# Patient Record
Sex: FEMALE | Race: WHITE | NOT HISPANIC OR LATINO
[De-identification: names, ages, dates, MRNs, and addresses within clinical notes are randomized per-mention and may not be internally consistent; named-entity substitution may affect disease eponyms.]

---

## 2022-11-15 PROBLEM — Z00.00 ENCOUNTER FOR PREVENTIVE HEALTH EXAMINATION: Status: ACTIVE | Noted: 2022-11-15

## 2022-11-23 ENCOUNTER — APPOINTMENT (OUTPATIENT)
Dept: NEUROSURGERY | Facility: CLINIC | Age: 62
End: 2022-11-23

## 2022-11-23 ENCOUNTER — NON-APPOINTMENT (OUTPATIENT)
Age: 62
End: 2022-11-23

## 2022-11-23 VITALS
HEART RATE: 74 BPM | BODY MASS INDEX: 19.31 KG/M2 | RESPIRATION RATE: 16 BRPM | TEMPERATURE: 97.6 F | OXYGEN SATURATION: 98 % | HEIGHT: 61.42 IN | WEIGHT: 103.62 LBS | SYSTOLIC BLOOD PRESSURE: 114 MMHG | DIASTOLIC BLOOD PRESSURE: 71 MMHG

## 2022-11-23 PROCEDURE — 99204 OFFICE O/P NEW MOD 45 MIN: CPT

## 2022-11-23 RX ORDER — CHROMIUM 200 MCG
TABLET ORAL
Refills: 0 | Status: ACTIVE | COMMUNITY

## 2022-11-23 NOTE — ASSESSMENT
[FreeTextEntry1] : 61-year-old English-speaking female who presents after progressive left-sided hearing loss confirmed on audiometry over the past year and was found to have a left-sided subcentimeter intracanalicular enhancing lesion concerning for an acoustic neuroma.  She does report intermittent balance issues, but otherwise is neurologically intact.  We discussed the natural history of the disease.  I explained to her that there were 2 options available: the patient could continue with serial imaging.  I also offered gamma knife radiosurgery as a therapeutic option.  I have recommended that she seek consultation with the radiation oncology department at [facility] for the discussion of risks and benefits of gamma knife radiosurgery for this lesion.  I have also ordered her a follow-up MRI at 3 months.  She demonstrated an excellent understanding.\par \par Dr. D' Amico independently reviewed all available images with patient. \par \par PLAN: \par - [facility] referral to discuss GKRS vs surveillance imaging \par - MRI brain with IAC 3 months\par \par Patient verbalizes understanding of today’s discussion and next steps in treatment plan. \par \par  \par A total of 45 minutes was spent reviewing the labs, imaging and physical examination of the patient. We discussed the diagnosis, and the plan. The patient's questions were answered. The patient demonstrated an excellent understanding of the plan.

## 2022-11-23 NOTE — HISTORY OF PRESENT ILLNESS
[de-identified] : 62 y/o, Guyanese speaking female with PMHx of who presents today for evaluation of abnormal MRI brain during workup for left hearing loss.\par \par Pt endorses left hearing loss that progressively worsened over the past year. States hearing diminished and with swoosh sound, muffled sounding. She went to her doctor who performed audiology exam and endorses was told left sided hearing loss for which MRI brain with IAC was ordered which she brings today to review. \par She denies headaches. Notes intermittent dizziness. \par \par Denies upper/lower extremity weakness. States will occasionally have bilateral hand weakness when she is dehydrated. \par

## 2022-11-23 NOTE — REASON FOR VISIT
[New Patient Visit] : a new patient visit [[employer] ] : provided by [employer]   [Interpreters_IDNumber] : [id number] [TWNoteComboBox1] : Ken[place]

## 2022-11-23 NOTE — PHYSICAL EXAM
[General Appearance - Alert] : alert [General Appearance - In No Acute Distress] : in no acute distress [General Appearance - Well Nourished] : well nourished [General Appearance - Well-Appearing] : healthy appearing [Oriented To Time, Place, And Person] : oriented to person, place, and time [Impaired Insight] : insight and judgment were intact [Affect] : the affect was normal [Memory Recent] : recent memory was not impaired [Person] : oriented to person [Place] : oriented to place [Time] : oriented to time [Short Term Intact] : short term memory intact [Motor Tone] : muscle tone was normal in all four extremities [Motor Strength] : muscle strength was normal in all four extremities [Sensation Tactile Decrease] : light touch was intact [Sclera] : the sclera and conjunctiva were normal [PERRL With Normal Accommodation] : pupils were equal in size, round, reactive to light, with normal accommodation [Extraocular Movements] : extraocular movements were intact [Neck Appearance] : the appearance of the neck was normal [] : no respiratory distress [Respiration, Rhythm And Depth] : normal respiratory rhythm and effort [Abnormal Walk] : normal gait [Skin Color & Pigmentation] : normal skin color and pigmentation [FreeTextEntry5] : CN II - XII grossly intact with left sided diminished hearing on exam

## 2022-11-23 NOTE — REVIEW OF SYSTEMS
[As Noted in HPI] : as noted in HPI [Fever] : no fever [Chills] : no chills [Confused or Disoriented] : no confusion [Facial Weakness] : no facial weakness [Arm Weakness] : no arm weakness [Hand Weakness] : no hand weakness [Leg Weakness] : no leg weakness [Seizures] : no convulsions [Difficulty Walking] : no difficulty walking [Eyesight Problems] : no eyesight problems [Chest Pain] : no chest pain [Palpitations] : no palpitations [Shortness Of Breath] : no shortness of breath

## 2022-11-28 ENCOUNTER — APPOINTMENT (OUTPATIENT)
Dept: RADIATION ONCOLOGY | Facility: CLINIC | Age: 62
End: 2022-11-28

## 2022-11-28 DIAGNOSIS — E87.1 HYPO-OSMOLALITY AND HYPONATREMIA: ICD-10-CM

## 2022-11-28 DIAGNOSIS — R79.89 OTHER SPECIFIED ABNORMAL FINDINGS OF BLOOD CHEMISTRY: ICD-10-CM

## 2022-11-28 DIAGNOSIS — Z78.9 OTHER SPECIFIED HEALTH STATUS: ICD-10-CM

## 2022-11-28 PROCEDURE — 99024 POSTOP FOLLOW-UP VISIT: CPT

## 2022-11-28 RX ORDER — NORMAL SALT TABLETS 1 G/G
TABLET ORAL
Refills: 0 | Status: ACTIVE | COMMUNITY

## 2022-11-28 NOTE — PHYSICAL EXAM
[Normal] : well developed, well nourished, in no acute distress [Oriented To Time, Place, And Person] : oriented to person, place, and time

## 2022-11-28 NOTE — REASON FOR VISIT
[Consideration of Therapy for Benign Disease] : consideration of therapy for benign disease [Other: ___] : [unfilled] [[employer] ] : provided by [employer]   [Interpreters_IDNumber] : [id number] [TWNoteComboBox1] : Singaporean

## 2023-03-01 ENCOUNTER — APPOINTMENT (OUTPATIENT)
Dept: NEUROSURGERY | Facility: CLINIC | Age: 63
End: 2023-03-01
Payer: MEDICAID

## 2023-03-01 VITALS
WEIGHT: 103 LBS | RESPIRATION RATE: 18 BRPM | TEMPERATURE: 97 F | HEIGHT: 61 IN | SYSTOLIC BLOOD PRESSURE: 90 MMHG | HEART RATE: 81 BPM | OXYGEN SATURATION: 98 % | BODY MASS INDEX: 19.45 KG/M2 | DIASTOLIC BLOOD PRESSURE: 63 MMHG

## 2023-03-01 PROCEDURE — 99212 OFFICE O/P EST SF 10 MIN: CPT

## 2023-03-01 NOTE — ASSESSMENT
[FreeTextEntry1] : 61-year-old Albanian-speaking female who presents after progressive left-sided hearing loss confirmed on audiometry over the past year and was found to have a left-sided subcentimeter intracanalicular enhancing lesion concerning for an acoustic neuroma.  She does report intermittent balance issues, but otherwise is neurologically intact.  Symptoms stable. MRI stable at 3 months. We discussed the natural history of the disease.  I explained to her that there were 2 options available: the patient could continue with serial imaging.  I also offered gamma knife radiosurgery as a therapeutic option. She wishes to continue with surveilance imaging.  I have also ordered her a follow-up MRI at 6 months.  She demonstrated an excellent understanding.\par \par Dr. D' Amico independently reviewed all available images with patient. \par \par PLAN: \par - Repeat MRI brain with IAC 6 months\par - RTC after MRI to review \par \par Patient verbalizes understanding of today’s discussion and next steps in treatment plan. \par \par \par A total of 15 minutes was spent reviewing the labs, imaging and physical examination of the patient. We discussed the diagnosis, and the plan. The patient's questions were answered. The patient demonstrated an excellent understanding of the plan.

## 2023-03-01 NOTE — HISTORY OF PRESENT ILLNESS
[FreeTextEntry1] : 60 y/o, Citizen of [place] speaking female with no significant PMHx who presented for evaluation of left internal auditory canal enhancing lesion on MRI 11/18/22 found during workup for progressive left hearing loss x 1 year. \par \par 11/23/22 initial presentation: \par States hearing diminished and with swoosh sound, muffled sound. She went to her doctor who performed audiology exam which confirmed left sided sensorineural hearing loss. MRI brain with IAC was ordered which she completed 11/18/22. \par She denies headaches. Notes intermittent dizziness. \par Pt was referrer to discuss GKRS- she met with Dr. Travis and opted for 3 month interval scan. \par \par TODAY patient returns for follow- up and MRI review. \par Denies any changes since prior visit. Continued left sided diminished hearing, denies worsening over the past 3 months. \par Denies headaches, balance issues. Continues to have intermittent dizziness and nausea.

## 2023-03-01 NOTE — REASON FOR VISIT
[Follow-Up: _____] : a [unfilled] follow-up visit [[employer] ] : provided by [employer]   [FreeTextEntry1] : left internal auditory canal enhancing lesion, review 3 month repeat MRI [Interpreters_IDNumber] : [id number] [TWNoteComboBox1] : Citizen of [place]

## 2023-09-07 PROBLEM — H91.92 HEARING LOSS ON LEFT: Status: ACTIVE | Noted: 2022-11-23

## 2023-09-07 PROBLEM — D33.3 VESTIBULAR SCHWANNOMA: Status: ACTIVE | Noted: 2022-11-23

## 2023-09-07 NOTE — HISTORY OF PRESENT ILLNESS
[FreeTextEntry1] : 61 y/o, Bermudian speaking female with no significant PMHx, found to have left internal auditory canal enhancing lesion on MRI 11/18/22 found during workup for progressive left hearing loss x 1 year.  11/23/22 initial presentation: States hearing diminished and with swoosh sound, muffled sound. She went to her doctor who performed audiology exam which confirmed left sided sensorineural hearing loss. MRI brain with IAC was ordered which she completed 11/18/22.  Notes intermittent dizziness, no headaches. Referred to discuss gkrs- she met with Dr. Travis and opted for 3 month interval scan.  3/1/23 pt returned for f/u. MRI 2/16/23 stable @ 3 months. Discussed GKRS vs continues surveillance and pt wishes to continue to monitor.   Returns TODAY for 6 month f/u and imaging review.

## 2023-09-13 ENCOUNTER — APPOINTMENT (OUTPATIENT)
Dept: NEUROSURGERY | Facility: CLINIC | Age: 63
End: 2023-09-13
Payer: MEDICAID

## 2023-09-13 VITALS
OXYGEN SATURATION: 98 % | BODY MASS INDEX: 19.45 KG/M2 | HEART RATE: 73 BPM | WEIGHT: 103 LBS | SYSTOLIC BLOOD PRESSURE: 113 MMHG | RESPIRATION RATE: 18 BRPM | DIASTOLIC BLOOD PRESSURE: 74 MMHG | HEIGHT: 61 IN

## 2023-09-13 DIAGNOSIS — H91.92 UNSPECIFIED HEARING LOSS, LEFT EAR: ICD-10-CM

## 2023-09-13 DIAGNOSIS — D33.3 BENIGN NEOPLASM OF CRANIAL NERVES: ICD-10-CM

## 2023-09-13 PROCEDURE — 99212 OFFICE O/P EST SF 10 MIN: CPT

## 2023-10-30 ENCOUNTER — APPOINTMENT (OUTPATIENT)
Dept: OTOLARYNGOLOGY | Facility: CLINIC | Age: 63
End: 2023-10-30

## 2024-08-27 NOTE — HISTORY OF PRESENT ILLNESS
[FreeTextEntry1] : 62 y/o, South Sudanese speaking female with no significant PMHx, found to have left internal auditory canal enhancing lesion on MRI 11/18/22 found during workup for progressive left hearing loss x 1 year.  11/23/22 initial presentation: States hearing diminished and with swoosh sound, muffled sound. She went to her doctor who performed audiology exam which confirmed left sided sensorineural hearing loss. MRI brain with IAC was ordered which she completed 11/18/22. Notes intermittent dizziness, no headaches. Referred to discuss gkrs- she met with Dr. Travis and opted for 3 month interval scan.  3/1/23 pt returned for f/u. MRI 2/16/23 stable @ 3 months. Discussed GKRS vs continued surveillance and pt wishes to continue to monitor.  9/13/23 pt returned for 6 month f/u and imaging review. Completed MRI @ [facility] 9/11/23 and report read as stable. Denies any changes since last visit in her left hearing, slight right sided hearing loss that is new. Has not repeated audiology exam officially. Again discussed SKRS vs continued surveillance and pt wishes to continue to monitor. Recommended to repeat MRI brain and IAC one year. Also referred for audiology testing.   Returns TODAY for annual follow- up and MRI review that she completed @ [facility]. Brings CD with her for apt.

## 2024-08-27 NOTE — REASON FOR VISIT
[Follow-Up: _____] : a [unfilled] follow-up visit [FreeTextEntry1] :  left internal auditory canal enhancing lesion, 1 year follow- up and MRI review

## 2024-09-17 ENCOUNTER — OUTPATIENT (OUTPATIENT)
Dept: OUTPATIENT SERVICES | Facility: HOSPITAL | Age: 64
LOS: 1 days | End: 2024-09-17
Payer: MEDICAID

## 2024-09-17 ENCOUNTER — APPOINTMENT (OUTPATIENT)
Dept: MRI IMAGING | Facility: HOSPITAL | Age: 64
End: 2024-09-17

## 2024-09-17 PROCEDURE — A9585: CPT

## 2024-09-17 PROCEDURE — 70553 MRI BRAIN STEM W/O & W/DYE: CPT

## 2024-09-17 PROCEDURE — 70553 MRI BRAIN STEM W/O & W/DYE: CPT | Mod: 26,76

## 2024-09-25 ENCOUNTER — NON-APPOINTMENT (OUTPATIENT)
Age: 64
End: 2024-09-25

## 2024-09-25 ENCOUNTER — APPOINTMENT (OUTPATIENT)
Dept: NEUROSURGERY | Facility: CLINIC | Age: 64
End: 2024-09-25
Payer: MEDICAID

## 2024-09-25 VITALS
DIASTOLIC BLOOD PRESSURE: 68 MMHG | SYSTOLIC BLOOD PRESSURE: 108 MMHG | HEART RATE: 66 BPM | OXYGEN SATURATION: 98 % | BODY MASS INDEX: 19.45 KG/M2 | HEIGHT: 61 IN | RESPIRATION RATE: 18 BRPM | WEIGHT: 103 LBS

## 2024-09-25 DIAGNOSIS — D33.3 BENIGN NEOPLASM OF CRANIAL NERVES: ICD-10-CM

## 2024-09-25 DIAGNOSIS — H91.92 UNSPECIFIED HEARING LOSS, LEFT EAR: ICD-10-CM

## 2024-09-25 PROCEDURE — 99212 OFFICE O/P EST SF 10 MIN: CPT

## 2024-09-25 NOTE — REASON FOR VISIT
[[employer] ] : provided by [employer]   [FreeTextEntry1] :  left internal auditory canal enhancing lesion, 1 year follow- up and MRI review  [Interpreters_IDNumber] : [id number]

## 2024-09-25 NOTE — ASSESSMENT
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 No changes were observed in the annual follow-up. However, there is a report of worsening hearing on both sides.Continue to monitor the condition regularly, with additional audiometry due to increasing hearing loss symptoms. The patient has expressed preference to continue surveillance rather than pursue treatment options.We discussed the natural history of the disease.  I explained to her that there were 2 options available: the patient could continue with serial imaging.  I also offered gamma knife radiosurgery as a therapeutic option. She wishes to continue with surveilance imaging.  I have also ordered her a follow-up MRI at 1 year.  She demonstrated an excellent understanding.  Dr. D'Amico independently reviewed all available images with patient.  PLAN: -MRI brain with IAC in 1 year -return to clinic after to review  -continue f/u with audiologist annually, seeing next Oct 2024  Patient verbalizes understanding of today's discussion and next steps in treatment plan.  Today, my ACP, Yanci Amato, was here to observe my evaluation and management services for this new problem/exacerbated condition to be followed going forward.   A total of 15 minutes was spent reviewing the labs, imaging and physical examination of the patient. We discussed the diagnosis, and the plan. The patient's questions were answered. The patient demonstrated an excellent understanding of the plan.

## 2024-09-25 NOTE — ASSESSMENT
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 No changes were observed in the annual follow-up. However, there is a report of worsening hearing on both sides.Continue to monitor the condition regularly, with additional audiometry due to increasing hearing loss symptoms. The patient has expressed preference to continue surveillance rather than pursue treatment options.We discussed the natural history of the disease.  I explained to her that there were 2 options available: the patient could continue with serial imaging.  I also offered gamma knife radiosurgery as a therapeutic option. She wishes to continue with surveilance imaging.  I have also ordered her a follow-up MRI at 1 year.  She demonstrated an excellent understanding.  Dr. D'Amico independently reviewed all available images with patient.  PLAN: -MRI brain with IAC in 1 year -return to clinic after to review  -continue f/u with audiologist annually, seeing next Oct 2024  Patient verbalizes understanding of today's discussion and next steps in treatment plan.  Today, my ACP, Yanci Amato, was here to observe my evaluation and management services for this new problem/exacerbated condition to be followed going forward.   A total of 15 minutes was spent reviewing the labs, imaging and physical examination of the patient. We discussed the diagnosis, and the plan. The patient's questions were answered. The patient demonstrated an excellent understanding of the plan.

## 2024-09-25 NOTE — ASSESSMENT
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 No changes were observed in the annual follow-up. However, there is a report of worsening hearing on both sides.Continue to monitor the condition regularly, with additional audiometry due to increasing hearing loss symptoms. The patient has expressed preference to continue surveillance rather than pursue treatment options.We discussed the natural history of the disease.  I explained to her that there were 2 options available: the patient could continue with serial imaging.  I also offered gamma knife radiosurgery as a therapeutic option. She wishes to continue with surveilance imaging.  I have also ordered her a follow-up MRI at 1 year.  She demonstrated an excellent understanding.  Dr. D'Amico independently reviewed all available images with patient.  PLAN: -MRI brain with IAC in 1 year -return to clinic after to review  -continue f/u with audiologist annually, seeing next Oct 2024  Patient verbalizes understanding of today's discussion and next steps in treatment plan.  Today, my ACP, Yanci Amato, was here to observe my evaluation and management services for this new problem/exacerbated condition to be followed going forward.   A total of 15 minutes was spent reviewing the labs, imaging and physical examination of the patient. We discussed the diagnosis, and the plan. The patient's questions were answered. The patient demonstrated an excellent understanding of the plan.

## 2024-09-25 NOTE — REASON FOR VISIT
[[employer] ] : provided by [employer]   [FreeTextEntry1] :  left internal auditory canal enhancing lesion, 1 year follow- up and MRI review  [Interpreters_IDNumber] : [id number]

## 2024-09-25 NOTE — REASON FOR VISIT
[[employer] ] : provided by [employer]   [FreeTextEntry1] :  left internal auditory canal enhancing lesion, 1 year follow- up and MRI review  [Interpreters_IDNumber] : [id number]

## 2024-09-25 NOTE — HISTORY OF PRESENT ILLNESS
[FreeTextEntry1] : 62 y/o, Tunisian speaking female with no significant PMHx, found to have left internal auditory canal enhancing lesion on MRI 11/18/22 found during workup for progressive left hearing loss x 1 year.  11/23/22 initial presentation: States hearing diminished and with swoosh sound, muffled sound. She went to her doctor who performed audiology exam which confirmed left sided sensorineural hearing loss. MRI brain with IAC was ordered which she completed 11/18/22. Notes intermittent dizziness, no headaches. Referred to discuss gkrs- she met with Dr. Travis and opted for 3 month interval scan.  3/1/23 pt returned for f/u. MRI 2/16/23 stable @ 3 months. Discussed GKRS vs continued surveillance and pt wishes to continue to monitor.  9/13/23 pt returned for 6 month f/u and imaging review. Completed MRI @ [facility] 9/11/23 and report read as stable. Denies any changes since last visit in her left hearing, slight right sided hearing loss that is new. Has not repeated audiology exam officially. Again discussed SKRS vs continued surveillance and pt wishes to continue to monitor. Recommended to repeat MRI brain and IAC one year. Also referred for audiology testing.   Returns TODAY for annual follow-up and imaging review. Completed MRI @ [facility] on 9/17/24 and report read as stable.  Reports hearing on left is stable and right sided hearing loss is worsening since last visit. Seeing audiologist end of October and reports had an audiogram 8 months ago which per patient was relatively stable.

## 2024-09-25 NOTE — REASON FOR VISIT
[[employer] ] : provided by [employer]   [FreeTextEntry1] :  left internal auditory canal enhancing lesion, 1 year follow- up and MRI review  [Interpreters_IDNumber] : [id number]

## 2024-09-25 NOTE — HISTORY OF PRESENT ILLNESS
[FreeTextEntry1] : 62 y/o, Georgian speaking female with no significant PMHx, found to have left internal auditory canal enhancing lesion on MRI 11/18/22 found during workup for progressive left hearing loss x 1 year.  11/23/22 initial presentation: States hearing diminished and with swoosh sound, muffled sound. She went to her doctor who performed audiology exam which confirmed left sided sensorineural hearing loss. MRI brain with IAC was ordered which she completed 11/18/22. Notes intermittent dizziness, no headaches. Referred to discuss gkrs- she met with Dr. Travis and opted for 3 month interval scan.  3/1/23 pt returned for f/u. MRI 2/16/23 stable @ 3 months. Discussed GKRS vs continued surveillance and pt wishes to continue to monitor.  9/13/23 pt returned for 6 month f/u and imaging review. Completed MRI @ [facility] 9/11/23 and report read as stable. Denies any changes since last visit in her left hearing, slight right sided hearing loss that is new. Has not repeated audiology exam officially. Again discussed SKRS vs continued surveillance and pt wishes to continue to monitor. Recommended to repeat MRI brain and IAC one year. Also referred for audiology testing.   Returns TODAY for annual follow-up and imaging review. Completed MRI @ [facility] on 9/17/24 and report read as stable.  Reports hearing on left is stable and right sided hearing loss is worsening since last visit. Seeing audiologist end of October and reports had an audiogram 8 months ago which per patient was relatively stable.

## 2024-09-25 NOTE — PHYSICAL EXAM
[General Appearance - Alert] : alert [General Appearance - In No Acute Distress] : in no acute distress [Oriented To Time, Place, And Person] : oriented to person, place, and time [Sclera] : the sclera and conjunctiva were normal [Outer Ear] : the ears and nose were normal in appearance [Neck Appearance] : the appearance of the neck was normal [] : no respiratory distress [Abnormal Walk] : normal gait [Skin Color & Pigmentation] : normal skin color and pigmentation

## 2024-09-25 NOTE — DATA REVIEWED
[de-identified] : ACC: [id number]     EXAM:  MR BRAIN WAW IC   ORDERED BY: ONEL FLETCHER  ACC: [id number]     EXAM:  MR IAC ONLY WAW IC   ORDERED BY: ONEL FLETCHER  PROCEDURE DATE:  09/17/2024    INTERPRETATION:  CLINICAL INDICATION: Vestibular schwannoma.  TECHNIQUE: Multi-planar multi-sequential MR imaging of the brain with special attention to the internal auditory canals was performed before and after the intravenous administration of 7.5 ml of Gadavist.  COMPARISON: MRI from 2/16/2023.  FINDINGS:  Enhancing nodule in the lateral aspect of the left internal auditory canal with no significant fundal cap measuring about 3 x 5 mm. Compared to the prior study from February 2023, this appears similar in size. No enhancement is seen within the inner ear. Signal within the inner ears is  normal.  No acute infarction or intracranial hemorrhage. No abnormal intraparenchymal enhancement is identified. Hippocampal sulcus remnant cyst in either bilaterally.  The ventricles are normal without evidence of hydrocephalus. There are no extra-axial fluid collections. The skull base flow voids are present.  The visualized intraorbital contents are normal. The imaged portions of the paranasal sinuses demonstrate mild mucosal thickening.. The mastoid air cells are clear. The other visualized soft tissues and osseous structures appear normal.  IMPRESSION:   1. Stable size of left vestibular schwannoma compared to February 2023.

## 2024-09-25 NOTE — DATA REVIEWED
[de-identified] : ACC: [id number]     EXAM:  MR BRAIN WAW IC   ORDERED BY: ONEL FLETCHER  ACC: [id number]     EXAM:  MR IAC ONLY WAW IC   ORDERED BY: ONEL FLETCHER  PROCEDURE DATE:  09/17/2024    INTERPRETATION:  CLINICAL INDICATION: Vestibular schwannoma.  TECHNIQUE: Multi-planar multi-sequential MR imaging of the brain with special attention to the internal auditory canals was performed before and after the intravenous administration of 7.5 ml of Gadavist.  COMPARISON: MRI from 2/16/2023.  FINDINGS:  Enhancing nodule in the lateral aspect of the left internal auditory canal with no significant fundal cap measuring about 3 x 5 mm. Compared to the prior study from February 2023, this appears similar in size. No enhancement is seen within the inner ear. Signal within the inner ears is  normal.  No acute infarction or intracranial hemorrhage. No abnormal intraparenchymal enhancement is identified. Hippocampal sulcus remnant cyst in either bilaterally.  The ventricles are normal without evidence of hydrocephalus. There are no extra-axial fluid collections. The skull base flow voids are present.  The visualized intraorbital contents are normal. The imaged portions of the paranasal sinuses demonstrate mild mucosal thickening.. The mastoid air cells are clear. The other visualized soft tissues and osseous structures appear normal.  IMPRESSION:   1. Stable size of left vestibular schwannoma compared to February 2023.

## 2024-09-25 NOTE — HISTORY OF PRESENT ILLNESS
[FreeTextEntry1] : 62 y/o, Somali speaking female with no significant PMHx, found to have left internal auditory canal enhancing lesion on MRI 11/18/22 found during workup for progressive left hearing loss x 1 year.  11/23/22 initial presentation: States hearing diminished and with swoosh sound, muffled sound. She went to her doctor who performed audiology exam which confirmed left sided sensorineural hearing loss. MRI brain with IAC was ordered which she completed 11/18/22. Notes intermittent dizziness, no headaches. Referred to discuss gkrs- she met with Dr. Travis and opted for 3 month interval scan.  3/1/23 pt returned for f/u. MRI 2/16/23 stable @ 3 months. Discussed GKRS vs continued surveillance and pt wishes to continue to monitor.  9/13/23 pt returned for 6 month f/u and imaging review. Completed MRI @ [facility] 9/11/23 and report read as stable. Denies any changes since last visit in her left hearing, slight right sided hearing loss that is new. Has not repeated audiology exam officially. Again discussed SKRS vs continued surveillance and pt wishes to continue to monitor. Recommended to repeat MRI brain and IAC one year. Also referred for audiology testing.   Returns TODAY for annual follow-up and imaging review. Completed MRI @ [facility] on 9/17/24 and report read as stable.  Reports hearing on left is stable and right sided hearing loss is worsening since last visit. Seeing audiologist end of October and reports had an audiogram 8 months ago which per patient was relatively stable.

## 2024-09-25 NOTE — DATA REVIEWED
[de-identified] : ACC: [id number]     EXAM:  MR BRAIN WAW IC   ORDERED BY: ONEL FLETCHER  ACC: [id number]     EXAM:  MR IAC ONLY WAW IC   ORDERED BY: ONEL FLETCHER  PROCEDURE DATE:  09/17/2024    INTERPRETATION:  CLINICAL INDICATION: Vestibular schwannoma.  TECHNIQUE: Multi-planar multi-sequential MR imaging of the brain with special attention to the internal auditory canals was performed before and after the intravenous administration of 7.5 ml of Gadavist.  COMPARISON: MRI from 2/16/2023.  FINDINGS:  Enhancing nodule in the lateral aspect of the left internal auditory canal with no significant fundal cap measuring about 3 x 5 mm. Compared to the prior study from February 2023, this appears similar in size. No enhancement is seen within the inner ear. Signal within the inner ears is  normal.  No acute infarction or intracranial hemorrhage. No abnormal intraparenchymal enhancement is identified. Hippocampal sulcus remnant cyst in either bilaterally.  The ventricles are normal without evidence of hydrocephalus. There are no extra-axial fluid collections. The skull base flow voids are present.  The visualized intraorbital contents are normal. The imaged portions of the paranasal sinuses demonstrate mild mucosal thickening.. The mastoid air cells are clear. The other visualized soft tissues and osseous structures appear normal.  IMPRESSION:   1. Stable size of left vestibular schwannoma compared to February 2023.

## 2024-09-25 NOTE — ASSESSMENT
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 No changes were observed in the annual follow-up. However, there is a report of worsening hearing on both sides.Continue to monitor the condition regularly, with additional audiometry due to increasing hearing loss symptoms. The patient has expressed preference to continue surveillance rather than pursue treatment options.We discussed the natural history of the disease.  I explained to her that there were 2 options available: the patient could continue with serial imaging.  I also offered gamma knife radiosurgery as a therapeutic option. She wishes to continue with surveilance imaging.  I have also ordered her a follow-up MRI at 1 year.  She demonstrated an excellent understanding.  Dr. D'Amico independently reviewed all available images with patient.  PLAN: -MRI brain with IAC in 1 year -return to clinic after to review  -continue f/u with audiologist annually, seeing next Oct 2024  Patient verbalizes understanding of today's discussion and next steps in treatment plan.  Today, my ACP, Yanci Amato, was here to observe my evaluation and management services for this new problem/exacerbated condition to be followed going forward.   A total of 15 minutes was spent reviewing the labs, imaging and physical examination of the patient. We discussed the diagnosis, and the plan. The patient's questions were answered. The patient demonstrated an excellent understanding of the plan.

## 2024-10-09 ENCOUNTER — APPOINTMENT (OUTPATIENT)
Dept: OTOLARYNGOLOGY | Facility: CLINIC | Age: 64
End: 2024-10-09
Payer: MEDICAID

## 2024-10-09 PROCEDURE — 92557 COMPREHENSIVE HEARING TEST: CPT

## 2024-10-09 PROCEDURE — 92550 TYMPANOMETRY & REFLEX THRESH: CPT | Mod: 52

## 2024-10-23 ENCOUNTER — APPOINTMENT (OUTPATIENT)
Dept: NEUROSURGERY | Facility: CLINIC | Age: 64
End: 2024-10-23

## 2025-09-11 ENCOUNTER — APPOINTMENT (OUTPATIENT)
Dept: MRI IMAGING | Facility: HOSPITAL | Age: 65
End: 2025-09-11

## 2025-09-17 ENCOUNTER — APPOINTMENT (OUTPATIENT)
Dept: NEUROSURGERY | Facility: CLINIC | Age: 65
End: 2025-09-17
Payer: MEDICAID

## 2025-09-17 VITALS
RESPIRATION RATE: 18 BRPM | HEART RATE: 77 BPM | DIASTOLIC BLOOD PRESSURE: 67 MMHG | HEIGHT: 61 IN | BODY MASS INDEX: 19.45 KG/M2 | OXYGEN SATURATION: 96 % | WEIGHT: 103 LBS | SYSTOLIC BLOOD PRESSURE: 102 MMHG

## 2025-09-17 DIAGNOSIS — H53.9 UNSPECIFIED VISUAL DISTURBANCE: ICD-10-CM

## 2025-09-17 DIAGNOSIS — D33.3 BENIGN NEOPLASM OF CRANIAL NERVES: ICD-10-CM

## 2025-09-17 PROCEDURE — 99213 OFFICE O/P EST LOW 20 MIN: CPT
